# Patient Record
(demographics unavailable — no encounter records)

---

## 2024-11-20 NOTE — PHYSICAL EXAM
[Oriented To Time, Place, And Person] : oriented to person, place, and time [Person] : oriented to person [Place] : oriented to place [Time] : oriented to time [Short Term Intact] : short term memory intact [Remote Intact] : remote memory intact [Span Intact] : the attention span was normal [Concentration Intact] : normal concentrating ability [Fluency] : fluency intact [Comprehension] : comprehension intact [Current Events] : adequate knowledge of current events [Past History] : adequate knowledge of personal past history [Vocabulary] : adequate range of vocabulary [Cranial Nerves Optic (II)] : visual acuity intact bilaterally,  pupils equal round and reactive to light [Cranial Nerves Oculomotor (III)] : extraocular motion intact [Cranial Nerves Trigeminal (V)] : facial sensation intact symmetrically [Cranial Nerves Facial (VII)] : face symmetrical [Cranial Nerves Vestibulocochlear (VIII)] : hearing was intact bilaterally [Cranial Nerves Glossopharyngeal (IX)] : tongue and palate midline [Cranial Nerves Accessory (XI - Cranial And Spinal)] : head turning and shoulder shrug symmetric [Cranial Nerves Hypoglossal (XII)] : there was no tongue deviation with protrusion [Motor Tone] : muscle tone was normal in all four extremities [Motor Strength] : muscle strength was normal in all four extremities [No Muscle Atrophy] : normal bulk in all four extremities [Sensation Tactile Decrease] : light touch was intact [Abnormal Walk] : normal gait [Balance] : balance was intact [2+] : Patella left 2+ [Over the Past 2 Weeks, Have You Felt Down, Depressed, or Hopeless?] : 1.) Over the past 2 weeks, have you felt down, depressed, or hopeless? No [Over the Past 2 Weeks, Have You Felt Little Interest or Pleasure Doing Things?] : 2.) Over the past 2 weeks, have you felt little interest or pleasure doing things? No [Past-pointing] : there was no past-pointing [Tremor] : no tremor present

## 2024-11-20 NOTE — ASSESSMENT
[FreeTextEntry1] : Ms. Wilhelm is a very pleasant 24 year old female with a history of a 4.5 mm Chiari malformation on MRI in 2018. She does report occasional strain induced headaches, but she is more concerned with blackout episodes and memory issues. She is otherwise neurologically intact. Some of her symptoms are consistent with Chiari, and some are not, so I want to repeat imaging to make sure there is no other pathology at play contributing to her symptoms, and to confirm that her Chiari is stable as she is concerned her symptoms are worsening. I will also order MRI C and T spine down to conus to confirm there is no syrinx as she has a Chiari malformation - MRI brain w/o contrast including CINE flow study to evaluate Chiari malformation and possible CSF flow impairment - MRI C and T spine down to level of conus medullaris - Return to office after MRI is complete

## 2024-11-20 NOTE — HISTORY OF PRESENT ILLNESS
[de-identified] : Ms. Wilhelm is a very pleasant 24 year old female with a history of a Chiari malformation. She initially presented with occipital headaches and "nerve pain radiating from posteriorly to her optic nerves anteriorly." In 2018 she got an MRI brain. Neither the MRI images nor the report were available for me to review today; however, per the patient, the MRI showed  a 4.5 mm Chiari. Today she says the symptoms have persisted and now include blackout episodes and memory issues. She is otherwise neurologically intact. She will occasionally get strain induced headaches.